# Patient Record
Sex: FEMALE | Race: WHITE | NOT HISPANIC OR LATINO | Employment: UNEMPLOYED | URBAN - METROPOLITAN AREA
[De-identification: names, ages, dates, MRNs, and addresses within clinical notes are randomized per-mention and may not be internally consistent; named-entity substitution may affect disease eponyms.]

---

## 2017-09-07 ENCOUNTER — OFFICE VISIT (OUTPATIENT)
Dept: OBSTETRICS AND GYNECOLOGY | Facility: CLINIC | Age: 57
End: 2017-09-07
Payer: COMMERCIAL

## 2017-09-07 VITALS
WEIGHT: 243 LBS | RESPIRATION RATE: 16 BRPM | DIASTOLIC BLOOD PRESSURE: 90 MMHG | SYSTOLIC BLOOD PRESSURE: 120 MMHG | BODY MASS INDEX: 43.05 KG/M2 | HEART RATE: 78 BPM | HEIGHT: 63 IN

## 2017-09-07 DIAGNOSIS — Z87.42 HISTORY OF ABNORMAL CERVICAL PAP SMEAR: ICD-10-CM

## 2017-09-07 DIAGNOSIS — Z12.31 ENCOUNTER FOR SCREENING MAMMOGRAM FOR BREAST CANCER: ICD-10-CM

## 2017-09-07 DIAGNOSIS — R87.610 ATYPICAL SQUAMOUS CELLS OF UNDETERMINED SIGNIFICANCE ON CYTOLOGIC SMEAR OF CERVIX (ASC-US): ICD-10-CM

## 2017-09-07 DIAGNOSIS — R87.610 ATYPICAL SQUAMOUS CELL CHANGES OF UNDETERMINED SIGNIFICANCE (ASCUS) ON CERVICAL CYTOLOGY WITH NEGATIVE HIGH RISK HUMAN PAPILLOMA VIRUS (HPV) TEST RESULT: Primary | ICD-10-CM

## 2017-09-07 PROBLEM — R87.619 ABNORMAL PAP SMEAR OF CERVIX: Status: ACTIVE | Noted: 2017-09-07

## 2017-09-07 PROCEDURE — 88175 CYTOPATH C/V AUTO FLUID REDO: CPT

## 2017-09-07 PROCEDURE — 99999 PR PBB SHADOW E&M-EST. PATIENT-LVL III: CPT | Mod: PBBFAC,,, | Performed by: OBSTETRICS & GYNECOLOGY

## 2017-09-07 PROCEDURE — 3074F SYST BP LT 130 MM HG: CPT | Mod: S$GLB,,, | Performed by: OBSTETRICS & GYNECOLOGY

## 2017-09-07 PROCEDURE — 99213 OFFICE O/P EST LOW 20 MIN: CPT | Mod: S$GLB,,, | Performed by: OBSTETRICS & GYNECOLOGY

## 2017-09-07 PROCEDURE — 3080F DIAST BP >= 90 MM HG: CPT | Mod: S$GLB,,, | Performed by: OBSTETRICS & GYNECOLOGY

## 2017-09-07 PROCEDURE — 3008F BODY MASS INDEX DOCD: CPT | Mod: S$GLB,,, | Performed by: OBSTETRICS & GYNECOLOGY

## 2017-09-07 NOTE — PROGRESS NOTES
Subjective:    Patient ID: Karyn Bryan is a 56 y.o. female    Chief Complaint:   Chief Complaint   Patient presents with    Abnormal Pap Smear     repeat pap        History of Present Illness:  Karyn presents today for follow up of an abnormal PAP. Her last PAP was performed on 12/6/2016 and was read as ASCUS and negative HPV. She has not had problems with vaginal discharge or abnormal bleeding. She denies pelvic pain.  Colposcopy findings on 12/6/2016 revealed normal exam without visible pathology. ECC was negative for pathology.    PAP Findings  4/2011--- ASCUS can not exclude HGSL ( ecc normal )  8/2011--ASCUS, Neg HPV.  8/2012--ASCUS with NEGATIVE high risk HPV  2/2013--ASCUS with NEGATIVE high risk HPV  9/2013--ASCUS with NEGATIVE high risk HPV  11/18/14-- ASCUS with Neg high risk HPV  8/24/15-- ASCUS with Neg HR HPV  4/25/16--ASCUS with Neg HR HPV  12/6/16 -- ASCUS with Neg HR HPV  12/6/16 -- ECC Normal    Review of Systems   Constitutional: Negative for activity change, appetite change, chills, diaphoresis, fatigue, fever and unexpected weight change.   HENT: Negative for mouth sores and tinnitus.    Eyes: Negative for discharge and visual disturbance.   Respiratory: Negative for cough, shortness of breath and wheezing.    Cardiovascular: Negative for chest pain, palpitations and leg swelling.   Gastrointestinal: Negative for abdominal pain, blood in stool, constipation, diarrhea, nausea and vomiting.   Endocrine: Negative for diabetes, hair loss, hot flashes, hyperthyroidism and hypothyroidism.   Genitourinary: Negative for decreased libido, dyspareunia, dysuria, flank pain, frequency, genital sores, hematuria, menorrhagia, menstrual problem, pelvic pain, urgency, vaginal bleeding, vaginal discharge, vaginal pain, urinary incontinence, postcoital bleeding and vaginal odor.   Musculoskeletal: Negative for back pain, joint swelling and myalgias.   Skin:  Negative for rash, no acne and hair changes.    Neurological: Negative for seizures, syncope, numbness and headaches.   Hematological: Negative for adenopathy. Does not bruise/bleed easily.   Psychiatric/Behavioral: Negative for sleep disturbance. The patient is not nervous/anxious.    Breast: Negative for breast pain and nipple discharge        Objective:    Vital Signs  Vitals:    09/07/17 1642   BP: (!) 120/90   Pulse: 78   Resp: 16         Physical Exam:  General:  alert,normal appearing gravid female   Abdomen:  soft, non-tender. Bowel sounds normal. No masses,  no organomegaly   Pelvis: External genitalia: normal general appearance  Urinary system: urethral meatus normal, bladder nontender  Vaginal: normal mucosa without prolapse or lesions  Cervix: normal appearance  Uterus: normal single, nontender  Adnexa: normal bimanual exam       Assessment:      1. Atypical squamous cell changes of undetermined significance (ASCUS) on cervical cytology with negative high risk human papilloma virus (HPV) test result    2. History of abnormal cervical Pap smear          Plan:      Atypical squamous cell changes of undetermined significance (ASCUS) on cervical cytology with negative high risk human papilloma virus (HPV) test result  -     Liquid-based pap smear, diagnostic  -     [facility] Procedure ([facility]); Future; Expected date: 09/07/2017    History of abnormal cervical Pap smear  -     [facility] OB/[facility]N Procedure ([facility]); Future; Expected date: 09/07/2017

## 2017-10-02 ENCOUNTER — PROCEDURE VISIT (OUTPATIENT)
Dept: OBSTETRICS AND GYNECOLOGY | Facility: CLINIC | Age: 57
End: 2017-10-02
Payer: COMMERCIAL

## 2017-10-02 DIAGNOSIS — Z87.42 HISTORY OF ABNORMAL CERVICAL PAP SMEAR: ICD-10-CM

## 2017-10-02 DIAGNOSIS — R87.610 ATYPICAL SQUAMOUS CELL CHANGES OF UNDETERMINED SIGNIFICANCE (ASCUS) ON CERVICAL CYTOLOGY WITH NEGATIVE HIGH RISK HUMAN PAPILLOMA VIRUS (HPV) TEST RESULT: ICD-10-CM

## 2017-10-02 PROCEDURE — 76830 TRANSVAGINAL US NON-OB: CPT | Mod: S$GLB,,, | Performed by: OBSTETRICS & GYNECOLOGY

## 2017-12-28 ENCOUNTER — HOSPITAL ENCOUNTER (OUTPATIENT)
Dept: RADIOLOGY | Facility: HOSPITAL | Age: 57
Discharge: HOME | End: 2017-12-28
Attending: OBSTETRICS & GYNECOLOGY
Payer: COMMERCIAL

## 2017-12-28 VITALS — WEIGHT: 243 LBS | HEIGHT: 63 IN | BODY MASS INDEX: 43.05 KG/M2

## 2017-12-28 DIAGNOSIS — Z12.31 ENCOUNTER FOR SCREENING MAMMOGRAM FOR BREAST CANCER: ICD-10-CM

## 2017-12-28 PROCEDURE — 77067 SCR MAMMO BI INCL CAD: CPT | Mod: 26,,, | Performed by: RADIOLOGY

## 2017-12-28 PROCEDURE — 77063 BREAST TOMOSYNTHESIS BI: CPT | Mod: 26,,, | Performed by: RADIOLOGY

## 2017-12-28 PROCEDURE — 77067 SCR MAMMO BI INCL CAD: CPT | Mod: TC

## 2018-09-13 ENCOUNTER — OFFICE VISIT (OUTPATIENT)
Dept: OBSTETRICS AND GYNECOLOGY | Facility: CLINIC | Age: 58
End: 2018-09-13
Payer: COMMERCIAL

## 2018-09-13 VITALS
SYSTOLIC BLOOD PRESSURE: 118 MMHG | WEIGHT: 246.38 LBS | HEIGHT: 63 IN | DIASTOLIC BLOOD PRESSURE: 76 MMHG | BODY MASS INDEX: 43.66 KG/M2 | RESPIRATION RATE: 14 BRPM | HEART RATE: 86 BPM

## 2018-09-13 DIAGNOSIS — Z87.42 HISTORY OF ABNORMAL CERVICAL PAP SMEAR: ICD-10-CM

## 2018-09-13 DIAGNOSIS — Z11.59 ENCOUNTER FOR HEPATITIS C SCREENING TEST FOR LOW RISK PATIENT: ICD-10-CM

## 2018-09-13 DIAGNOSIS — Z01.419 WELL WOMAN EXAM WITH ROUTINE GYNECOLOGICAL EXAM: Primary | ICD-10-CM

## 2018-09-13 DIAGNOSIS — Z12.31 ENCOUNTER FOR SCREENING MAMMOGRAM FOR BREAST CANCER: ICD-10-CM

## 2018-09-13 PROCEDURE — 99999 PR PBB SHADOW E&M-EST. PATIENT-LVL III: CPT | Mod: PBBFAC,,, | Performed by: OBSTETRICS & GYNECOLOGY

## 2018-09-13 PROCEDURE — 3074F SYST BP LT 130 MM HG: CPT | Mod: CPTII,S$GLB,, | Performed by: OBSTETRICS & GYNECOLOGY

## 2018-09-13 PROCEDURE — 88175 CYTOPATH C/V AUTO FLUID REDO: CPT

## 2018-09-13 PROCEDURE — 99396 PREV VISIT EST AGE 40-64: CPT | Mod: S$GLB,,, | Performed by: OBSTETRICS & GYNECOLOGY

## 2018-09-13 PROCEDURE — 3078F DIAST BP <80 MM HG: CPT | Mod: CPTII,S$GLB,, | Performed by: OBSTETRICS & GYNECOLOGY

## 2018-09-13 RX ORDER — NAPROXEN SODIUM 220 MG/1
81 TABLET, FILM COATED ORAL DAILY
COMMUNITY

## 2018-09-13 NOTE — PROGRESS NOTES
Subjective:    Patient ID: Karyn Bryan is a 57 y.o. female.     Chief Complaint: Annual Well Woman Exam     History of Present Illness:  Karyn presents today for Annual Well Woman exam. .Patient's last menstrual period was 2009.. She is currently using no hormone replacement therapy and she reports no problems with hot flashes, night sweats, irritability and vaginal dryness. She denies breast tenderness, masses, nipple discharge.  She reports no problems with urination. Bowel movements have not significantly changed. She has had a history of ASCUS PAP's with Negative HPV.    Menstrual History:   Patient's last menstrual period was 2009..     OB History      Para Term  AB Living    2 2 2          SAB TAB Ectopic Multiple Live Births                       Review of Systems   Constitutional: Negative for activity change, appetite change, chills, diaphoresis, fatigue, fever and unexpected weight change.   HENT: Negative for mouth sores and tinnitus.    Eyes: Negative for discharge and visual disturbance.   Respiratory: Negative for cough, shortness of breath and wheezing.    Cardiovascular: Negative for chest pain, palpitations and leg swelling.   Gastrointestinal: Negative for abdominal pain, blood in stool, constipation, diarrhea, nausea and vomiting.   Endocrine: Negative for diabetes, hair loss, hot flashes, hyperthyroidism and hypothyroidism.   Genitourinary: Negative for decreased libido, dyspareunia, dysuria, flank pain, frequency, genital sores, hematuria, menorrhagia, menstrual problem, pelvic pain, urgency, vaginal bleeding, vaginal discharge, vaginal pain, urinary incontinence, postcoital bleeding and vaginal odor.   Musculoskeletal: Negative for back pain, joint swelling and myalgias.   Skin:  Negative for rash, no acne and hair changes.   Neurological: Negative for seizures, syncope, numbness and headaches.   Hematological: Negative for adenopathy. Does not bruise/bleed easily.    Psychiatric/Behavioral: Negative for sleep disturbance. The patient is not nervous/anxious.    Breast: Negative for breast pain and nipple discharge        Objective:    Vital Signs:  Vitals:    09/13/18 1625   BP: 118/76   Pulse: 86   Resp: 14       Physical Exam:  General:  alert,normal appearing gravid female   Skin:  Skin color, texture, turgor normal. No rashes or lesions   HEENT:  conjunctivae/corneas clear. PERRL.   Neck: supple, trachea midline, no adenopathy or thyromegally   Respiratory:  clear to auscultation bilaterally   Heart:  regular rate and rhythm, S1, S2 normal, no murmur, click, rub or gallop   Breasts:   Nipples are protruding and have no nipple discharge. No palpable masses, erythema, skin changes, tenderness, or adenopathy.   Abdomen:  soft, non-tender. Bowel sounds normal. No masses,  no organomegaly   Pelvis: External genitalia: normal general appearance  Urinary system: urethral meatus normal, bladder nontender  Vaginal: atrophic mucosa  Cervix: normal appearance  Uterus: normal single, nontender  Adnexa: normal bimanual exam   Extremities: Normal ROM; no edema, no cyanosis   Neurologial: Normal strength and tone. No focal numbness or weakness. Reflexes 2+ and equal.   Psychiatric: normal mood, speech, dress, and thought processes         Assessment:      1. Well woman exam with routine gynecological exam    2. History of abnormal cervical Pap smear          Plan:      Well woman exam with routine gynecological exam    History of abnormal cervical Pap smear    Other orders  -     Liquid-based pap smear, diagnostic        COUNSELING:  Karyn was counseled on A.C.O.G. Pap guidelines and recommendations for yearly pelvic exams in addition to recommendations for yearly mammograms and monthly self breast exams. In addition she was counseled on adequate intake of calcium and vitamin D; to see her PCP for other health maintenance.

## 2018-09-24 ENCOUNTER — TELEPHONE (OUTPATIENT)
Dept: OBSTETRICS AND GYNECOLOGY | Facility: CLINIC | Age: 58
End: 2018-09-24

## 2019-01-03 ENCOUNTER — HOSPITAL ENCOUNTER (OUTPATIENT)
Dept: RADIOLOGY | Facility: HOSPITAL | Age: 59
Discharge: HOME | End: 2019-01-03
Attending: OBSTETRICS & GYNECOLOGY
Payer: COMMERCIAL

## 2019-01-03 VITALS — HEIGHT: 63 IN | BODY MASS INDEX: 43.59 KG/M2 | WEIGHT: 246 LBS

## 2019-01-03 DIAGNOSIS — Z12.31 ENCOUNTER FOR SCREENING MAMMOGRAM FOR BREAST CANCER: ICD-10-CM

## 2019-01-03 PROCEDURE — 77063 BREAST TOMOSYNTHESIS BI: CPT | Mod: 26,,, | Performed by: RADIOLOGY

## 2019-01-03 PROCEDURE — 77063 MAMMO DIGITAL SCREENING BILAT WITH TOMOSYNTHESIS_CAD: ICD-10-PCS | Mod: 26,,, | Performed by: RADIOLOGY

## 2019-01-03 PROCEDURE — 77067 SCR MAMMO BI INCL CAD: CPT | Mod: TC

## 2019-01-03 PROCEDURE — 77067 MAMMO DIGITAL SCREENING BILAT WITH TOMOSYNTHESIS_CAD: ICD-10-PCS | Mod: 26,,, | Performed by: RADIOLOGY

## 2019-01-03 PROCEDURE — 77067 SCR MAMMO BI INCL CAD: CPT | Mod: 26,,, | Performed by: RADIOLOGY

## 2019-12-23 ENCOUNTER — TELEPHONE (OUTPATIENT)
Dept: OBSTETRICS AND GYNECOLOGY | Facility: CLINIC | Age: 59
End: 2019-12-23

## 2019-12-23 DIAGNOSIS — Z12.31 BREAST CANCER SCREENING BY MAMMOGRAM: Primary | ICD-10-CM

## 2019-12-23 NOTE — TELEPHONE ENCOUNTER
----- Message from Katelyn Zelaya LPN sent at 12/23/2019  4:27 PM CST -----  Contact: Self      ----- Message -----  From: Layla Turner  Sent: 12/23/2019   4:00 PM CST  To: [facility] Staff    Please link mammo orders. Annual scheduled 1/21/20

## 2020-01-16 ENCOUNTER — HOSPITAL ENCOUNTER (OUTPATIENT)
Dept: RADIOLOGY | Facility: HOSPITAL | Age: 60
Discharge: HOME | End: 2020-01-16
Attending: FAMILY MEDICINE
Payer: COMMERCIAL

## 2020-01-16 VITALS — WEIGHT: 246 LBS | HEIGHT: 63 IN | BODY MASS INDEX: 43.59 KG/M2

## 2020-01-16 DIAGNOSIS — Z12.31 BREAST CANCER SCREENING BY MAMMOGRAM: ICD-10-CM

## 2020-01-16 PROCEDURE — 77067 MAMMO DIGITAL SCREENING BILAT WITH TOMOSYNTHESIS_CAD: ICD-10-PCS | Mod: 26,,, | Performed by: RADIOLOGY

## 2020-01-16 PROCEDURE — 77063 BREAST TOMOSYNTHESIS BI: CPT | Mod: 26,,, | Performed by: RADIOLOGY

## 2020-01-16 PROCEDURE — 77063 MAMMO DIGITAL SCREENING BILAT WITH TOMOSYNTHESIS_CAD: ICD-10-PCS | Mod: 26,,, | Performed by: RADIOLOGY

## 2020-01-16 PROCEDURE — 77067 SCR MAMMO BI INCL CAD: CPT | Mod: TC

## 2020-01-16 PROCEDURE — 77067 SCR MAMMO BI INCL CAD: CPT | Mod: 26,,, | Performed by: RADIOLOGY

## 2020-01-21 ENCOUNTER — OFFICE VISIT (OUTPATIENT)
Dept: OBSTETRICS AND GYNECOLOGY | Facility: CLINIC | Age: 60
End: 2020-01-21
Payer: COMMERCIAL

## 2020-01-21 VITALS
BODY MASS INDEX: 43.77 KG/M2 | WEIGHT: 247 LBS | SYSTOLIC BLOOD PRESSURE: 130 MMHG | HEART RATE: 72 BPM | DIASTOLIC BLOOD PRESSURE: 86 MMHG | HEIGHT: 63 IN | RESPIRATION RATE: 12 BRPM

## 2020-01-21 DIAGNOSIS — Z01.419 WELL WOMAN EXAM WITH ROUTINE GYNECOLOGICAL EXAM: Primary | ICD-10-CM

## 2020-01-21 DIAGNOSIS — Z12.4 CERVICAL CANCER SCREENING: ICD-10-CM

## 2020-01-21 DIAGNOSIS — Z87.42 HISTORY OF ABNORMAL CERVICAL PAP SMEAR: ICD-10-CM

## 2020-01-21 PROCEDURE — 88175 CYTOPATH C/V AUTO FLUID REDO: CPT

## 2020-01-21 PROCEDURE — 99396 PR PREVENTIVE VISIT,EST,40-64: ICD-10-PCS | Mod: S$GLB,,, | Performed by: OBSTETRICS & GYNECOLOGY

## 2020-01-21 PROCEDURE — 99396 PREV VISIT EST AGE 40-64: CPT | Mod: S$GLB,,, | Performed by: OBSTETRICS & GYNECOLOGY

## 2020-01-21 PROCEDURE — 99999 PR PBB SHADOW E&M-EST. PATIENT-LVL III: ICD-10-PCS | Mod: PBBFAC,,, | Performed by: OBSTETRICS & GYNECOLOGY

## 2020-01-21 PROCEDURE — 3075F SYST BP GE 130 - 139MM HG: CPT | Mod: CPTII,S$GLB,, | Performed by: OBSTETRICS & GYNECOLOGY

## 2020-01-21 PROCEDURE — 99999 PR PBB SHADOW E&M-EST. PATIENT-LVL III: CPT | Mod: PBBFAC,,, | Performed by: OBSTETRICS & GYNECOLOGY

## 2020-01-21 PROCEDURE — 3079F PR MOST RECENT DIASTOLIC BLOOD PRESSURE 80-89 MM HG: ICD-10-PCS | Mod: CPTII,S$GLB,, | Performed by: OBSTETRICS & GYNECOLOGY

## 2020-01-21 PROCEDURE — 3075F PR MOST RECENT SYSTOLIC BLOOD PRESS GE 130-139MM HG: ICD-10-PCS | Mod: CPTII,S$GLB,, | Performed by: OBSTETRICS & GYNECOLOGY

## 2020-01-21 PROCEDURE — 3079F DIAST BP 80-89 MM HG: CPT | Mod: CPTII,S$GLB,, | Performed by: OBSTETRICS & GYNECOLOGY

## 2020-01-21 RX ORDER — FUROSEMIDE 40 MG/1
TABLET ORAL
COMMUNITY
Start: 2019-10-25

## 2020-01-21 NOTE — PROGRESS NOTES
Subjective:    Patient ID: Karyn Bryan is a 59 y.o. female.     Chief Complaint: Annual Well Woman Exam     History of Present Illness:  Karyn presents today for Annual Well Woman exam. She reports that her menstrual cycles are regular every 28-30 days with normal flow with minimal cramping. She denies pelvic pain. She denies breast tenderness, denies masses, denies nipple discharge. She denies difficulty with urination. Bowel movements have not significantly changed. Her current contraceptive method is tubal ligation and she reports no problems. She has had numerous abnormal PAP smears (ASCUS) but HPV testing has always been negative. Her last 2 PAP's were normal.    Menstrual History:   Patient's last menstrual period was 2009..     OB History        2    Para   2    Term   2            AB        Living           SAB        TAB        Ectopic        Multiple        Live Births                       Review of Systems   Constitutional: Negative for activity change, appetite change, chills, diaphoresis, fatigue, fever and unexpected weight change.   HENT: Negative for mouth sores and tinnitus.    Eyes: Negative for discharge and visual disturbance.   Respiratory: Negative for cough, shortness of breath and wheezing.    Cardiovascular: Negative for chest pain, palpitations and leg swelling.   Gastrointestinal: Negative for abdominal pain, blood in stool, constipation, diarrhea, nausea and vomiting.   Endocrine: Negative for diabetes, hair loss, hot flashes, hyperthyroidism and hypothyroidism.   Genitourinary: Negative for decreased libido, dyspareunia, dysuria, flank pain, frequency, genital sores, hematuria, menorrhagia, menstrual problem, pelvic pain, urgency, vaginal bleeding, vaginal discharge, vaginal pain, urinary incontinence, postcoital bleeding and vaginal odor.   Musculoskeletal: Negative for back pain, joint swelling and myalgias.   Integumentary:  Negative for rash, acne, hair  changes and nipple discharge.   Neurological: Negative for seizures, syncope, numbness and headaches.   Hematological: Negative for adenopathy. Does not bruise/bleed easily.   Psychiatric/Behavioral: Negative for sleep disturbance. The patient is not nervous/anxious.    Breast: Negative for mastodynia and nipple discharge        Objective:    Vital Signs:  Vitals:    01/21/20 1049   BP: 130/86   Pulse: 72   Resp: 12       Physical Exam:  General:  alert,normal appearing gravid female   Skin:  Skin color, texture, turgor normal. No rashes or lesions   HEENT:  conjunctivae/corneas clear. PERRL.   Neck: supple, trachea midline, no adenopathy or thyromegally   Respiratory:  clear to auscultation bilaterally   Heart:  regular rate and rhythm, S1, S2 normal, no murmur, click, rub or gallop   Breasts:  Nipples are protruding and have no nipple discharge. No palpable masses, erythema, skin changes, tenderness, or adenopathy.   Abdomen:  soft, non-tender. Bowel sounds normal. No masses,  no organomegaly   Pelvis: External genitalia: normal general appearance  Urinary system: urethral meatus normal, bladder nontender  Vaginal: normal mucosa without prolapse or lesions  Cervix: normal appearance  Uterus: normal single, nontender  Adnexa: normal bimanual exam   Extremities: Normal ROM; no edema, no cyanosis   Neurologial: Normal strength and tone. No focal numbness or weakness. Reflexes 2+ and equal.   Psychiatric: normal mood, speech, dress, and thought processes           Assessment:      1. Well woman exam with routine gynecological exam    2. Cervical cancer screening    3. History of abnormal cervical Pap smear          Plan:      Well woman exam with routine gynecological exam    Cervical cancer screening  -     Liquid-Based Pap Smear, Diagnostic    History of abnormal cervical Pap smear        COUNSELING:  Karyn was counseled on A.C.O.G. Pap guidelines and recommendations for yearly pelvic exams in addition to  recommendations for yearly mammograms and monthly self breast exams. In addition she was counseled on adequate intake of calcium and vitamin D; to see her PCP for other health maintenance

## 2020-02-11 LAB
FINAL PATHOLOGIC DIAGNOSIS: NORMAL
Lab: NORMAL